# Patient Record
Sex: FEMALE | Race: ASIAN | NOT HISPANIC OR LATINO | Employment: OTHER | URBAN - METROPOLITAN AREA
[De-identification: names, ages, dates, MRNs, and addresses within clinical notes are randomized per-mention and may not be internally consistent; named-entity substitution may affect disease eponyms.]

---

## 2017-05-03 ENCOUNTER — AMBULATORY - HEALTHEAST (OUTPATIENT)
Dept: LAB | Facility: CLINIC | Age: 72
End: 2017-05-03

## 2017-05-03 DIAGNOSIS — Z12.11 COLON CANCER SCREENING: ICD-10-CM

## 2017-12-08 ENCOUNTER — COMMUNICATION - HEALTHEAST (OUTPATIENT)
Dept: ADMINISTRATIVE | Facility: CLINIC | Age: 72
End: 2017-12-08

## 2017-12-11 ENCOUNTER — AMBULATORY - HEALTHEAST (OUTPATIENT)
Dept: FAMILY MEDICINE | Facility: CLINIC | Age: 72
End: 2017-12-11

## 2017-12-11 ENCOUNTER — OFFICE VISIT - HEALTHEAST (OUTPATIENT)
Dept: FAMILY MEDICINE | Facility: CLINIC | Age: 72
End: 2017-12-11

## 2017-12-11 DIAGNOSIS — M94.9 DISORDER OF BONE AND CARTILAGE: ICD-10-CM

## 2017-12-11 DIAGNOSIS — M89.9 DISORDER OF BONE AND CARTILAGE: ICD-10-CM

## 2017-12-11 DIAGNOSIS — Z13.220 ENCOUNTER FOR SCREENING FOR LIPOID DISORDERS: ICD-10-CM

## 2017-12-11 DIAGNOSIS — E78.5 HYPERLIPIDEMIA, UNSPECIFIED HYPERLIPIDEMIA TYPE: ICD-10-CM

## 2017-12-11 DIAGNOSIS — Z13.1 ENCOUNTER FOR SCREENING FOR DIABETES MELLITUS: ICD-10-CM

## 2017-12-11 DIAGNOSIS — Z78.0 POSTMENOPAUSAL: ICD-10-CM

## 2017-12-11 DIAGNOSIS — Z00.00 ROUTINE GENERAL MEDICAL EXAMINATION AT A HEALTH CARE FACILITY: ICD-10-CM

## 2017-12-11 LAB
CHOLEST SERPL-MCNC: 160 MG/DL
FASTING STATUS PATIENT QL REPORTED: YES
HDLC SERPL-MCNC: 58 MG/DL
LDLC SERPL CALC-MCNC: 80 MG/DL
TRIGL SERPL-MCNC: 108 MG/DL

## 2017-12-11 ASSESSMENT — MIFFLIN-ST. JEOR: SCORE: 1027.47

## 2018-01-06 ENCOUNTER — COMMUNICATION - HEALTHEAST (OUTPATIENT)
Dept: FAMILY MEDICINE | Facility: CLINIC | Age: 73
End: 2018-01-06

## 2018-01-06 DIAGNOSIS — E78.5 HYPERLIPIDEMIA, UNSPECIFIED HYPERLIPIDEMIA TYPE: ICD-10-CM

## 2018-03-06 ENCOUNTER — OFFICE VISIT - HEALTHEAST (OUTPATIENT)
Dept: FAMILY MEDICINE | Facility: CLINIC | Age: 73
End: 2018-03-06

## 2018-03-06 DIAGNOSIS — R20.0 NUMBNESS OF LEFT ANTERIOR THIGH: ICD-10-CM

## 2018-03-06 DIAGNOSIS — M54.16 LUMBAR RADICULAR PAIN: ICD-10-CM

## 2018-03-06 ASSESSMENT — MIFFLIN-ST. JEOR: SCORE: 1026.56

## 2018-03-08 ENCOUNTER — COMMUNICATION - HEALTHEAST (OUTPATIENT)
Dept: FAMILY MEDICINE | Facility: CLINIC | Age: 73
End: 2018-03-08

## 2018-03-08 ENCOUNTER — AMBULATORY - HEALTHEAST (OUTPATIENT)
Dept: FAMILY MEDICINE | Facility: CLINIC | Age: 73
End: 2018-03-08

## 2018-03-08 DIAGNOSIS — Z12.11 COLON CANCER SCREENING: ICD-10-CM

## 2018-03-09 ENCOUNTER — COMMUNICATION - HEALTHEAST (OUTPATIENT)
Dept: FAMILY MEDICINE | Facility: CLINIC | Age: 73
End: 2018-03-09

## 2018-03-13 ENCOUNTER — HOSPITAL ENCOUNTER (OUTPATIENT)
Dept: MRI IMAGING | Facility: HOSPITAL | Age: 73
Discharge: HOME | End: 2018-03-13
Attending: FAMILY MEDICINE

## 2018-03-13 DIAGNOSIS — M54.16 LUMBAR RADICULAR PAIN: ICD-10-CM

## 2018-03-13 LAB
CREAT BLD-MCNC: 1 MG/DL
CREAT BLD-MCNC: 1 MG/DL (ref 0.6–1.1)
POC GFR AMER AF HE - HISTORICAL: >60 ML/MIN/1.73M2
POC GFR NON AMER AF HE - HISTORICAL: 54 ML/MIN/1.73M2

## 2018-03-15 ENCOUNTER — AMBULATORY - HEALTHEAST (OUTPATIENT)
Dept: FAMILY MEDICINE | Facility: CLINIC | Age: 73
End: 2018-03-15

## 2018-03-15 DIAGNOSIS — M54.16 LUMBAR RADICULAR PAIN: ICD-10-CM

## 2018-03-19 ENCOUNTER — COMMUNICATION - HEALTHEAST (OUTPATIENT)
Dept: FAMILY MEDICINE | Facility: CLINIC | Age: 73
End: 2018-03-19

## 2018-03-26 ENCOUNTER — COMMUNICATION - HEALTHEAST (OUTPATIENT)
Dept: FAMILY MEDICINE | Facility: CLINIC | Age: 73
End: 2018-03-26

## 2018-03-30 ENCOUNTER — OFFICE VISIT - HEALTHEAST (OUTPATIENT)
Dept: FAMILY MEDICINE | Facility: CLINIC | Age: 73
End: 2018-03-30

## 2018-03-30 DIAGNOSIS — J20.9 ACUTE BRONCHITIS: ICD-10-CM

## 2018-03-30 DIAGNOSIS — R05.9 COUGH IN ADULT: ICD-10-CM

## 2018-03-30 LAB
BASOPHILS # BLD AUTO: 0 THOU/UL (ref 0–0.2)
BASOPHILS NFR BLD AUTO: 0 % (ref 0–2)
EOSINOPHIL # BLD AUTO: 0.1 THOU/UL (ref 0–0.4)
EOSINOPHIL NFR BLD AUTO: 2 % (ref 0–6)
ERYTHROCYTE [DISTWIDTH] IN BLOOD BY AUTOMATED COUNT: 12.1 % (ref 11–14.5)
HCT VFR BLD AUTO: 43.2 % (ref 35–47)
HGB BLD-MCNC: 14.5 G/DL (ref 12–16)
LYMPHOCYTES # BLD AUTO: 0.5 THOU/UL (ref 0.8–4.4)
LYMPHOCYTES NFR BLD AUTO: 12 % (ref 20–40)
MCH RBC QN AUTO: 29.2 PG (ref 27–34)
MCHC RBC AUTO-ENTMCNC: 33.7 G/DL (ref 32–36)
MCV RBC AUTO: 87 FL (ref 80–100)
MONOCYTES # BLD AUTO: 0.4 THOU/UL (ref 0–0.9)
MONOCYTES NFR BLD AUTO: 9 % (ref 2–10)
NEUTROPHILS # BLD AUTO: 3.1 THOU/UL (ref 2–7.7)
NEUTROPHILS NFR BLD AUTO: 77 % (ref 50–70)
PLATELET # BLD AUTO: 173 THOU/UL (ref 140–440)
PMV BLD AUTO: 8.6 FL (ref 7–10)
RBC # BLD AUTO: 4.97 MILL/UL (ref 3.8–5.4)
WBC: 4.1 THOU/UL (ref 4–11)

## 2018-04-17 ENCOUNTER — COMMUNICATION - HEALTHEAST (OUTPATIENT)
Dept: FAMILY MEDICINE | Facility: CLINIC | Age: 73
End: 2018-04-17

## 2018-04-23 ENCOUNTER — RECORDS - HEALTHEAST (OUTPATIENT)
Dept: ADMINISTRATIVE | Facility: OTHER | Age: 73
End: 2018-04-23

## 2018-04-25 ENCOUNTER — COMMUNICATION - HEALTHEAST (OUTPATIENT)
Dept: FAMILY MEDICINE | Facility: CLINIC | Age: 73
End: 2018-04-25

## 2018-05-01 ENCOUNTER — RECORDS - HEALTHEAST (OUTPATIENT)
Dept: ADMINISTRATIVE | Facility: OTHER | Age: 73
End: 2018-05-01

## 2018-05-01 ENCOUNTER — COMMUNICATION - HEALTHEAST (OUTPATIENT)
Dept: FAMILY MEDICINE | Facility: CLINIC | Age: 73
End: 2018-05-01

## 2018-05-08 ENCOUNTER — COMMUNICATION - HEALTHEAST (OUTPATIENT)
Dept: FAMILY MEDICINE | Facility: CLINIC | Age: 73
End: 2018-05-08

## 2018-05-22 ENCOUNTER — RECORDS - HEALTHEAST (OUTPATIENT)
Dept: ADMINISTRATIVE | Facility: OTHER | Age: 73
End: 2018-05-22

## 2018-09-17 ENCOUNTER — COMMUNICATION - HEALTHEAST (OUTPATIENT)
Dept: FAMILY MEDICINE | Facility: CLINIC | Age: 73
End: 2018-09-17

## 2018-09-17 DIAGNOSIS — E78.5 HYPERLIPIDEMIA, UNSPECIFIED HYPERLIPIDEMIA TYPE: ICD-10-CM

## 2018-09-18 ENCOUNTER — COMMUNICATION - HEALTHEAST (OUTPATIENT)
Dept: FAMILY MEDICINE | Facility: CLINIC | Age: 73
End: 2018-09-18

## 2018-11-06 ENCOUNTER — AMBULATORY - HEALTHEAST (OUTPATIENT)
Dept: FAMILY MEDICINE | Facility: CLINIC | Age: 73
End: 2018-11-06

## 2018-11-06 DIAGNOSIS — E78.5 HYPERLIPIDEMIA, UNSPECIFIED HYPERLIPIDEMIA TYPE: ICD-10-CM

## 2021-05-31 VITALS — WEIGHT: 131.7 LBS | BODY MASS INDEX: 24.24 KG/M2 | HEIGHT: 62 IN

## 2021-05-31 VITALS — BODY MASS INDEX: 24.2 KG/M2 | HEIGHT: 62 IN | WEIGHT: 131.5 LBS

## 2021-06-01 VITALS — WEIGHT: 133 LBS | BODY MASS INDEX: 24.48 KG/M2

## 2021-06-14 NOTE — PROGRESS NOTES
Assessment and Plan:     1. Routine general medical examination at a health care facility  Routine history and physical, updated in EMR.  Fasting labs updated as below.  Immunizations up-to-date with the exception of tetanus, discussed that this is not covered by insurance and patient defers this for now.  Plan repeat physical in 1 year.  - Lipid [place], FASTING; Future  - Glucose; Future  - Lipid [place], FASTING  - Glucose    2. Hyperlipidemia, unspecified hyperlipidemia type/Encounter for screening for lipoid disorders  Fasting lipid profile updated as below.  Continues on simvastatin without side effects.  - Lipid [place], FASTING; Future  - Lipid [place], FASTING    3. Osteopenia/Postmenopausal  Patient is due for updated bone density testing.  We will assist her with arranging this.  She continues on calcium and vitamin D.  - DXA Bone Density Scan; Future    4. Encounter for screening for diabetes mellitus  - Glucose      The patient's current medical problems were reviewed.    I have had an Advance Directives discussion with the patient.  The following health maintenance schedule was reviewed with the patient and provided in printed form in the after visit summary:   Health Maintenance   Topic Date Due     DXA SCAN  01/21/2008     FALL RISK ASSESSMENT  11/19/2016     TD 18+ HE  12/01/2016     INFLUENZA VACCINE RULE BASED (1) 08/01/2017     FECAL OCCULT BLOOD/FIT ANNUAL COLON CANCER SCREENING  05/03/2018     MAMMOGRAM  11/01/2018     ADVANCE DIRECTIVES DISCUSSED WITH PATIENT  11/19/2020     PNEUMOCOCCAL POLYSACCHARIDE VACCINE AGE 65 AND OVER  Completed     PNEUMOCOCCAL CONJUGATE VACCINE FOR ADULTS (PCV13 OR PREVNAR)  Completed     ZOSTER VACCINE  Completed        Subjective:   Chief Complaint: Janes Barboza is an 72 y.o. female here for an Annual Wellness visit.     HPI: Patient has been feeling very well.  She wonders whether she should continue on the same doses of her calcium and vitamin D.  She also notes  "that her friends seem to be on lower doses of cholesterol medications than she.  She has no other questions or concerns today.    Review of Systems:  Please see above.  The rest of the complete review of systems are negative for all systems.    Patient Care Team:  Liz Woodruff MD as PCP - General (Family Medicine)     Patient Active Problem List   Diagnosis     Vitamin D Deficiency     Hyperlipidemia     Osteopenia     History reviewed. No pertinent past medical history.   History reviewed. No pertinent surgical history.   Family History   Problem Relation Age of Onset     Hypertension Mother      Hypertension Father      Hypertension Sister      Breast cancer Neg Hx      Cancer Neg Hx      Colon cancer Neg Hx      Diabetes Neg Hx       Social History     Social History     Marital status:      Spouse name: N/A     Number of children: N/A     Years of education: N/A     Occupational History     Not on file.     Social History Main Topics     Smoking status: Never Smoker     Smokeless tobacco: Never Used     Alcohol use No     Drug use: Not on file     Sexual activity: Yes     Partners: Male      Comment:  Mitra Cramer     Other Topics Concern     Not on file     Social History Narrative      Current Outpatient Prescriptions   Medication Sig Dispense Refill     CALCIUM CITRATE/VITAMIN D3 (CITRACAL + D ORAL) Take by mouth 3 (three) times a day.        cholecalciferol, vitamin D3, 1,000 unit tablet Take 1,000 Units by mouth daily.       simvastatin (ZOCOR) 40 MG tablet TAKE ONE TABLET BY MOUTH DAILY AS DIRECTED 90 tablet 3     No current facility-administered medications for this visit.       Objective:   Vital Signs:   Visit Vitals     /76 (Patient Site: Right Arm, Patient Position: Sitting, Cuff Size: Adult Regular)     Pulse 68     Resp 16     Ht 5' 1.8\" (1.57 m)     Wt 131 lb 11.2 oz (59.7 kg)     Breastfeeding No     BMI 24.24 kg/m2        VisionScreening:  No exam data present " "    PHYSICAL EXAM  /76 (Patient Site: Right Arm, Patient Position: Sitting, Cuff Size: Adult Regular)  Pulse 68  Resp 16  Ht 5' 1.8\" (1.57 m)  Wt 131 lb 11.2 oz (59.7 kg)  Breastfeeding? No  BMI 24.24 kg/m2    General Appearance:    Alert, cooperative, no distress, appears stated age   Head:    Normocephalic, without obvious abnormality, atraumatic   Eyes:    PERRL, conjunctiva/corneas clear, EOM's intact both eyes   Ears:    Normal TM's and external ear canals, both ears   Nose:   Nares normal, septum midline, mucosa normal, no drainage     or sinus tenderness   Throat:   Lips, mucosa, and tongue normal; teeth and gums normal   Neck:   Supple, symmetrical, trachea midline, no adenopathy;     thyroid:  no enlargement/tenderness/nodules; no carotid    bruit or JVD   Back:     Symmetric, no curvature, ROM normal, no CVA tenderness   Lungs:     Clear to auscultation bilaterally, respirations unlabored   Chest Wall:    No tenderness or deformity    Heart:    Regular rate and rhythm, S1 and S2 normal, no murmur, rub    or gallop   Breast Exam:    No tenderness, masses, or nipple abnormality   Abdomen:     Soft, non-tender, bowel sounds active all four quadrants,     no masses, no organomegaly   Genitalia:    Not examined   Rectal:    Not examined   Extremities:   Extremities normal, atraumatic, no cyanosis or edema   Pulses:   2+ and symmetric all extremities   Skin:   Skin color, texture, turgor normal, no rashes or lesions   Lymph nodes:   Cervical, supraclavicular, and axillary nodes normal   Neurologic:   CNII-XII intact, normal strength, sensation and reflexes     throughout         Assessment Results 12/11/2017   Activities of Daily Living No help needed   Instrumental Activities of Daily Living No help needed   Mini Cog Total Score 3   Some recent data might be hidden     A Mini-Cog score of 0-2 suggests the possibility of dementia, score of 3-5 suggests no dementia  Mini Cog score 5 when explained to " patient, language barrier affected her score today.    Identified Health Risks:     The patient reports that she does not have all recommended working emergency equipment available. She was provided with information about emergency preparedness, including smoke detectors.  Patient's advanced directive was discussed and I am comfortable with the patient's wishes.  She has this paperwork at home and states she will bring it in at her convenience.

## 2021-06-16 NOTE — PROGRESS NOTES
I spoke with the patient about her MRI results and recommended she be evaluated at the spine clinic.  She agrees with the plan.

## 2021-06-16 NOTE — PROGRESS NOTES
Assessment / Impression     1. Lumbar radicular pain  MR Lumbar Spine Without Contrast   2. Numbness of left anterior thigh           Plan:     I expressed my concern that the numbness and tingling symptoms in her left anterior thigh could be due to nerve impingement in the lumbar spine.  She has tenderness in the lumbar region as well.  I recommended that we check an MRI of the lumbar spine.  She is being scheduled for this.  She is also given a referral to the [facility] spine clinic.  She may take Tylenol as needed.  I recommended she stay physically active as long as she is not participating in activities that worsen her symptoms.    Subjective:      HPI: Janes Barboza is a 73 y.o. female who presents to the clinic to be evaluated for numbness and tingling symptoms she has in her left anterior thigh over the past 3 months.  10 days ago she injured her low back shuffling which made the symptoms in her left thigh worse.  Her symptoms improve when she is sitting or laying down.  They become worse after she is standing/walking for greater than 10 minutes.  She denies having radiation of pain below the knee.  She has not tried any medications for this.  She has a history of low back pain, but without radiation.  She denies having history of fractures or surgery that involve the back.  He denies having weakness in her legs.  Hip flexion on the left does not worsen the numbness and tingling symptoms in her thigh.        Review of Systems  All other systems reviewed and are negative.     History   Smoking Status     Never Smoker   Smokeless Tobacco     Never Used       Family History   Problem Relation Age of Onset     Hypertension Mother      Hypertension Father      Hypertension Sister      Breast cancer Neg Hx      Cancer Neg Hx      Colon cancer Neg Hx      Diabetes Neg Hx        Objective:     /64 (Patient Site: Left Arm, Patient Position: Sitting, Cuff Size: Adult Regular)  Pulse 64  Temp 97.8  F (36.6  C)  "(Oral)   Resp 16  Ht 5' 1.8\" (1.57 m)  Wt 131 lb 8 oz (59.6 kg)  BMI 24.21 kg/m2  Physical Examination: General appearance - alert, well appearing, and in no distress  Eyes: pupils equal and reactive, extraocular eye movements intact  Mouth: mucous membranes moist, pharynx normal without lesions  Neck: supple, no significant adenopathy  Lungs: clear to auscultation, no wheezes, rales or rhonchi, symmetric air entry  Heart: normal rate, regular rhythm, normal S1, S2, no murmurs, rubs, clicks or gallops  Abdomen: soft, nontender, nondistended, no masses or organomegaly  Neurological: alert, oriented, normal speech, no focal findings or movement disorder noted.  Deep tendon reflexes 2 out of 4 bilaterally  Extremities: No edema, no clubbing or cyanosis.  Right leg testing negative bilaterally.  Internal and external hip rotation normal bilaterally.  Hip flexion is normal bilaterally and does not worsen symptoms.  Back: Nontender over the cervical and thoracic spine.  She is mildly tender over the mid-lower lumbar spine.  She is also tender of the right thoracolumbar paraspinal musculature  Psychiatric: Normal affect. Does not appear anxious or depressed.    No results found for this or any previous visit (from the past 168 hour(s)).    Current Outpatient Prescriptions   Medication Sig     CALCIUM CITRATE/VITAMIN D3 (CITRACAL + D ORAL) Take by mouth 3 (three) times a day.      cholecalciferol, vitamin D3, 1,000 unit tablet Take 1,000 Units by mouth daily.     simvastatin (ZOCOR) 40 MG tablet TAKE ONE TABLET BY MOUTH ONE TIME DAILY        "

## 2021-06-17 NOTE — PROGRESS NOTES
Assessment:     1. Acute bronchitis  HM1(CBC and Differential)    XR Chest 2 Views    HM1 (CBC with Diff)   2. Cough in adult       X-rays are reviewed by me   Acute Bronchitis  likely viral in origin.  No systemic signs of fever.  Discussed to do symptom management and anticipate recovery in few days time.  Cough may last longer in some individuals.     Plan:      Worsening signs and symptoms discussed.  Rest, fluids, acetaminophen, and humidification.  Follow up as needed for persistent, worsening cough, or appearance of new symptoms.     Subjective:      Chief Complaint   Patient presents with     Cough     x 6 days.     Chest Pain     x 1 day.     Headache     started today.     Toxic Inhalation     possible carbon monoxide exposure.        Janes Barboza is a 73 y.o. female who presents for evaluation of chills, fever, headache Chest pain, nonproductive cough, sore throat and unable to cough out. Symptoms began 6 days ago. Symptoms have been gradually worsening since that time. Past history is significant for HLD and recent spine issues with abnormal MRI.    Symptoms started on Sunday and around the same time there was some concerns of carbon monoxide increased level at their house.  They have moved out of the house for about 2 days    Patient is traveling to [place] about 1 week.  She would like to make sure that she is not developing any pneumonia.  She does like to discuss her MRI and the results were shared with her.  She was scheduled to see spinal specialist, however with concerns that insurance may not cover such a visit she had canceled appointment.  I have asked her to follow-up.    The following portions of the patient's history were reviewed and updated as appropriate: allergies, current medications, past family history, past medical history, past social history, past surgical history and problem list.    No Known Allergies    Current Outpatient Prescriptions on File Prior to Visit   Medication Sig  Dispense Refill     CALCIUM CITRATE/VITAMIN D3 (CITRACAL + D ORAL) Take by mouth 3 (three) times a day.        cholecalciferol, vitamin D3, 1,000 unit tablet Take 1,000 Units by mouth daily.       simvastatin (ZOCOR) 40 MG tablet TAKE ONE TABLET BY MOUTH ONE TIME DAILY  90 tablet 2     No current facility-administered medications on file prior to visit.        Patient Active Problem List   Diagnosis     Vitamin D Deficiency     Hyperlipidemia     Osteopenia       History reviewed. No pertinent past medical history.    History reviewed. No pertinent surgical history.    Family History   Problem Relation Age of Onset     Hypertension Mother      Hypertension Father      Hypertension Sister      Breast cancer Neg Hx      Cancer Neg Hx      Colon cancer Neg Hx      Diabetes Neg Hx        Social History     Social History     Marital status:      Spouse name: N/A     Number of children: N/A     Years of education: N/A     Social History Main Topics     Smoking status: Never Smoker     Smokeless tobacco: Never Used     Alcohol use No     Drug use: None     Sexual activity: Yes     Partners: Male      Comment:  Mitra Cramer     Other Topics Concern     None     Social History Narrative    She is  and lives with her .  She has adult child who lives in [place].    Lamar Bond MD  4/1/2018                 Review of Systems  Constitutional: negative  Gastrointestinal: negative  Integument/breast: negative      Objective:   BP (!) 137/91 (Patient Site: Left Arm, Patient Position: Sitting, Cuff Size: Adult Regular)  Pulse 93  Temp 99.2  F (37.3  C) (Temporal)   Wt 133 lb (60.3 kg)  SpO2 93%  BMI 24.48 kg/m2      General:Healthy, alert and in no acute distress  Head:  NCAT w/o lesions or tenderness  Eyes: conjunctivae/corneas clear. PERRL, EOM's intact. Fundi benign  Ears: normal TM's and external ear canals bilateral  Sinus tender: negative  Nose: Erythematous turbinates  Mouth: lips,  mucosa, and tongue normal. Teeth and gums normal. No tonsillar endangerment .Mild erythema of pharynx  Neck: supple, symmetrical, trachea midline.  Lungs: clear to auscultation bilaterally  Heart: RRR, No murmurs  Abdomen: Soft NTND, No HSM, No peritoneal signs, Rebound negative, [place]'s sign negative, No CVA tenderness.  Skin: No rashes

## 2021-07-18 ENCOUNTER — HEALTH MAINTENANCE LETTER (OUTPATIENT)
Age: 76
End: 2021-07-18

## 2021-09-12 ENCOUNTER — HEALTH MAINTENANCE LETTER (OUTPATIENT)
Age: 76
End: 2021-09-12

## 2022-08-14 ENCOUNTER — HEALTH MAINTENANCE LETTER (OUTPATIENT)
Age: 77
End: 2022-08-14

## 2022-11-19 ENCOUNTER — HEALTH MAINTENANCE LETTER (OUTPATIENT)
Age: 77
End: 2022-11-19

## 2023-09-09 ENCOUNTER — HEALTH MAINTENANCE LETTER (OUTPATIENT)
Age: 78
End: 2023-09-09